# Patient Record
Sex: FEMALE | Race: WHITE | NOT HISPANIC OR LATINO | Employment: PART TIME | URBAN - METROPOLITAN AREA
[De-identification: names, ages, dates, MRNs, and addresses within clinical notes are randomized per-mention and may not be internally consistent; named-entity substitution may affect disease eponyms.]

---

## 2024-01-17 ENCOUNTER — ANCILLARY PROCEDURE (OUTPATIENT)
Dept: RADIOLOGY | Facility: CLINIC | Age: 64
End: 2024-01-17
Payer: MEDICARE

## 2024-01-17 PROCEDURE — 71271 CT THORAX LUNG CANCER SCR C-: CPT

## 2024-03-26 ENCOUNTER — HOSPITAL ENCOUNTER (OUTPATIENT)
Dept: RADIOLOGY | Facility: CLINIC | Age: 64
Discharge: HOME | End: 2024-03-26
Payer: MEDICARE

## 2024-03-26 VITALS — HEIGHT: 64 IN

## 2024-03-26 DIAGNOSIS — Z12.31 SCREENING MAMMOGRAM FOR BREAST CANCER: ICD-10-CM

## 2024-03-26 PROCEDURE — 77067 SCR MAMMO BI INCL CAD: CPT | Performed by: RADIOLOGY

## 2024-03-26 PROCEDURE — 77067 SCR MAMMO BI INCL CAD: CPT

## 2024-03-26 PROCEDURE — 77063 BREAST TOMOSYNTHESIS BI: CPT | Performed by: RADIOLOGY

## 2024-03-27 ENCOUNTER — HOSPITAL ENCOUNTER (OUTPATIENT)
Dept: RADIOLOGY | Facility: EXTERNAL LOCATION | Age: 64
Discharge: HOME | End: 2024-03-27

## 2024-05-07 VITALS — WEIGHT: 160 LBS | BODY MASS INDEX: 28.35 KG/M2 | HEIGHT: 63 IN

## 2025-02-28 ENCOUNTER — HOSPITAL ENCOUNTER (OUTPATIENT)
Dept: RADIOLOGY | Facility: CLINIC | Age: 65
End: 2025-02-28
Payer: COMMERCIAL

## 2025-02-28 ENCOUNTER — APPOINTMENT (OUTPATIENT)
Dept: ORTHOPEDIC SURGERY | Facility: CLINIC | Age: 65
End: 2025-02-28
Payer: COMMERCIAL

## 2025-02-28 VITALS — WEIGHT: 165 LBS | HEIGHT: 63 IN | BODY MASS INDEX: 29.23 KG/M2

## 2025-02-28 DIAGNOSIS — M25.511 RIGHT SHOULDER PAIN, UNSPECIFIED CHRONICITY: Primary | ICD-10-CM

## 2025-02-28 DIAGNOSIS — M75.31 CALCIFIC TENDINITIS OF RIGHT SHOULDER: ICD-10-CM

## 2025-02-28 PROCEDURE — 20610 DRAIN/INJ JOINT/BURSA W/O US: CPT | Performed by: STUDENT IN AN ORGANIZED HEALTH CARE EDUCATION/TRAINING PROGRAM

## 2025-02-28 PROCEDURE — 1036F TOBACCO NON-USER: CPT | Performed by: STUDENT IN AN ORGANIZED HEALTH CARE EDUCATION/TRAINING PROGRAM

## 2025-02-28 PROCEDURE — 3008F BODY MASS INDEX DOCD: CPT | Performed by: STUDENT IN AN ORGANIZED HEALTH CARE EDUCATION/TRAINING PROGRAM

## 2025-02-28 PROCEDURE — 99204 OFFICE O/P NEW MOD 45 MIN: CPT | Performed by: STUDENT IN AN ORGANIZED HEALTH CARE EDUCATION/TRAINING PROGRAM

## 2025-02-28 RX ORDER — TRIAMCINOLONE ACETONIDE 40 MG/ML
80 INJECTION, SUSPENSION INTRA-ARTICULAR; INTRAMUSCULAR
Status: COMPLETED | OUTPATIENT
Start: 2025-02-28 | End: 2025-02-28

## 2025-02-28 RX ORDER — TRAZODONE HYDROCHLORIDE 50 MG/1
1 TABLET ORAL DAILY PRN
COMMUNITY

## 2025-02-28 RX ORDER — METHYLPREDNISOLONE 4 MG
TABLET, DOSE PACK ORAL
COMMUNITY

## 2025-02-28 RX ORDER — CHOLECALCIFEROL (VITAMIN D3) 50 MCG
1 TABLET ORAL DAILY
COMMUNITY

## 2025-02-28 RX ORDER — FLUOXETINE HYDROCHLORIDE 20 MG/1
CAPSULE ORAL
COMMUNITY
Start: 2025-01-30

## 2025-02-28 RX ORDER — PREDNISONE 10 MG/1
10 TABLET ORAL DAILY
COMMUNITY

## 2025-02-28 RX ORDER — LEVOTHYROXINE SODIUM 175 UG/1
1 TABLET ORAL DAILY
COMMUNITY

## 2025-02-28 RX ORDER — DIVALPROEX SODIUM 125 MG/1
250 TABLET, DELAYED RELEASE ORAL 2 TIMES DAILY
COMMUNITY

## 2025-02-28 RX ORDER — CYCLOBENZAPRINE HCL 10 MG
TABLET ORAL
COMMUNITY

## 2025-02-28 RX ORDER — MELOXICAM 15 MG/1
1 TABLET ORAL DAILY
COMMUNITY

## 2025-02-28 RX ORDER — LIDOCAINE HYDROCHLORIDE 10 MG/ML
5 INJECTION, SOLUTION INFILTRATION; PERINEURAL
Status: COMPLETED | OUTPATIENT
Start: 2025-02-28 | End: 2025-02-28

## 2025-02-28 RX ORDER — HYDROXYCHLOROQUINE SULFATE 200 MG/1
TABLET, FILM COATED ORAL
COMMUNITY

## 2025-02-28 RX ORDER — BUPROPION HYDROCHLORIDE 100 MG/1
100 TABLET ORAL
COMMUNITY

## 2025-02-28 RX ADMIN — TRIAMCINOLONE ACETONIDE 80 MG: 40 INJECTION, SUSPENSION INTRA-ARTICULAR; INTRAMUSCULAR at 11:51

## 2025-02-28 RX ADMIN — LIDOCAINE HYDROCHLORIDE 5 ML: 10 INJECTION, SOLUTION INFILTRATION; PERINEURAL at 11:51

## 2025-02-28 ASSESSMENT — PAIN - FUNCTIONAL ASSESSMENT: PAIN_FUNCTIONAL_ASSESSMENT: 0-10

## 2025-02-28 ASSESSMENT — PAIN SCALES - GENERAL: PAINLEVEL_OUTOF10: 3

## 2025-02-28 NOTE — PROGRESS NOTES
CHIEF COMPLAINT:   Chief Complaint   Patient presents with    Right Shoulder - Pain       History: 64 y.o. female presents to the office today for their progressive right shoulder problems for the past 6 months. Pain is at the superior part of the GH joint, mid muscle belly of the biceps brachi, and the middle past of the posterior forearm. Pain is worse with overhead or behind the back activities and shoulder abduction. Hx of rheumatoid arthritis. No Hx of trauma, Sx, or injections to the areas. Reports of one instance of numbness/tingling constant 1 months ago, but no constant or progressive  numbness/tingling since then. Denies associated neck pain. No diabetes. XR were transferred over from PACS. Tylenol, Mobic, Hydroxychloroquine, Prednisone and Flexeril PRN. PT done 3 years ago. No recent PT. 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  She is still very active.  She is right-hand dominant.  States that the pain has been ongoing for actually multiple years but has not had any recent treatment.        Past medical history, past surgical history, medications, allergies, family history, social history, and review of systems were reviewed today.    A 12 point review of systems was negative other than as stated in the HPI.    No past medical history on file.     Not on File     Past Surgical History:   Procedure Laterality Date    BREAST BIOPSY          No family history on file.     Social History     Socioeconomic History    Marital status:      Spouse name: Not on file    Number of children: Not on file    Years of education: Not on file    Highest education level: Not on file   Occupational History    Not on file   Tobacco Use    Smoking status: Not on file    Smokeless tobacco: Not on file   Substance and Sexual Activity    Alcohol use: Not on file    Drug use: Not on file    Sexual activity: Not on file   Other Topics Concern    Not on file   Social History Narrative    Not on file     Social Drivers  "of Health     Financial Resource Strain: Not on file   Food Insecurity: Not on file   Transportation Needs: Not on file   Physical Activity: Not on file   Stress: Not on file   Social Connections: Not on file   Intimate Partner Violence: Not on file   Housing Stability: Not on file        CURRENT MEDICATIONS:   Current Outpatient Medications   Medication Sig Dispense Refill    FLUoxetine (PROzac) 20 mg capsule       buPROPion (Wellbutrin) 100 mg tablet Take 1 tablet (100 mg) by mouth.      cholecalciferol (Vitamin D-3) 50 MCG (2000 UT) tablet Take 1 tablet (2,000 Units) by mouth once daily.      cyclobenzaprine (Flexeril) 10 mg tablet 1 tablet 1 to 2 hours before bedtime Orally Once a day for 30 day(s)      divalproex (Depakote) 125 mg EC tablet Take 2 tablets (250 mg) by mouth twice a day.      glucosamine sulfate 500 mg tablet Take by mouth.      hydroxychloroquine (Plaquenil) 200 mg tablet 1 tablet with food or milk Orally Once a day for 90 days      levothyroxine (Synthroid, Levoxyl) 175 mcg tablet Take 1 tablet (175 mcg) by mouth once daily.      meloxicam (Mobic) 15 mg tablet Take 1 tablet (15 mg) by mouth once daily.      traZODone (Desyrel) 50 mg tablet Take 1 tablet (50 mg) by mouth once daily as needed.       No current facility-administered medications for this visit.       Physical Examination:      4/27/2022     3:25 PM 3/26/2024     2:24 PM 3/26/2024     2:35 PM 5/7/2024     1:33 PM   Vitals   Systolic 119      Diastolic 80      Heart Rate 98      Temp 36.4 °C (97.5 °F)      Resp 20      Height  1.6 m (5' 3\") 1.626 m (5' 4\") 1.6 m (5' 3\")   Weight (lb)    160   BMI    28.34 kg/m2   BSA (m2)    1.8 m2      There is no height or weight on file to calculate BMI.    Well-appearing, appears stated age, pleasant and cooperative, appropriate mood and behavior. Height and weight reviewed. Alert and oriented x3.  Auditory function intact.  No acute distress.  Intact ocular function, DARI, EOMI. Breathing is " unlabored .  There is no evidence of jugular venous distension. Skin appearance is normal without evidence of rash or other lesions. 2+ radial pulses bilaterally, fingers pink and wwp, good capillary refill, no pitting edema. No appreciable lymphadenopathy in bilateral upper extremities. SILT throughout both upper extremities, median/radial/ulnar/musculocutaneous/axillary nerve motor and sensory intact (except for abnormalities noted in focused musculoskeletal exam section below).     Neck exam: Full range of motion of the neck in flexion/extension and rotational movements. No significant areas of tenderness to palpation in the neck.    On exam of bilateral upper extremities, still has relatively preserved range of motion of both shoulders, but it is painful on the right.  Forward flexion 170, external Tatian of 60, internal Tatian to T12.  Rotator cuff strength is preserved but painful on the right.  Pain with Neer Lafleur maneuvers of the right shoulder    Imaging: Radiographs of the right shoulder from January 2025 were reviewed.  Personally interpreted by myself.  There is a calcific deposit at the insertion of the supraspinatus    Assessment: Right calcific tendinitis     Plan: Long discussion with the patient with treatment options diagnosis.  Imaging is consistent with diagnosis of calcific tendinitis of the rotator cuff.  Exam is consistent with this as well.  Discussed the natural history of this.  She has apparently has a propensity to form calcium throughout her body.  Discussed that we will attempt an injection and physical therapy first to see if this gives her relief.  If not, we will see her in a few months and get an MRI.    Injection was performed, please see separate procedure note, patient tolerated well.     Patient ID: Arielle Mccormack is a 64 y.o. female.    L Inj/Asp: R subacromial bursa on 2/28/2025 11:51 AM  Indications: pain  Details: 22 G needle, posterior approach  Medications: 80 mg  triamcinolone acetonide 40 mg/mL; 5 mL lidocaine 10 mg/mL (1 %)  Outcome: tolerated well, no immediate complications  Procedure, treatment alternatives, risks and benefits explained, specific risks discussed. Consent was given by the patient. Immediately prior to procedure a time out was called to verify the correct patient, procedure, equipment, support staff and site/side marked as required. Patient was prepped and draped in the usual sterile fashion.           Dragon software was used to dictate this note, please be aware that minor errors in transcription may be present.    Lemuel Conrad MD    Shoulder/Elbow Surgery  [facility]

## 2025-03-06 ENCOUNTER — EVALUATION (OUTPATIENT)
Dept: PHYSICAL THERAPY | Facility: CLINIC | Age: 65
End: 2025-03-06
Payer: COMMERCIAL

## 2025-03-06 DIAGNOSIS — M25.511 RIGHT SHOULDER PAIN, UNSPECIFIED CHRONICITY: Primary | ICD-10-CM

## 2025-03-06 DIAGNOSIS — M75.31 CALCIFIC TENDINITIS OF RIGHT SHOULDER: ICD-10-CM

## 2025-03-06 PROCEDURE — 97110 THERAPEUTIC EXERCISES: CPT | Mod: GP

## 2025-03-06 PROCEDURE — 97161 PT EVAL LOW COMPLEX 20 MIN: CPT | Mod: GP

## 2025-03-06 ASSESSMENT — PAIN - FUNCTIONAL ASSESSMENT: PAIN_FUNCTIONAL_ASSESSMENT: 0-10

## 2025-03-06 ASSESSMENT — ENCOUNTER SYMPTOMS
OCCASIONAL FEELINGS OF UNSTEADINESS: 0
LOSS OF SENSATION IN FEET: 0
DEPRESSION: 0

## 2025-03-06 ASSESSMENT — PAIN SCALES - GENERAL: PAINLEVEL_OUTOF10: 1

## 2025-03-06 NOTE — PROGRESS NOTES
Physical Therapy    Physical Therapy Evaluation    Patient Name: Arielle Mccormack  MRN: [id number]  Today's Date: 3/6/2025  Name and date of birth 1960 were confirmed at the start of today's session.     Time Entry:  Time Calculation  Start Time: 0950  Stop Time: 1035  Time Calculation (min): 45 min  PT Evaluation Time Entry  PT Evaluation (Low) Time Entry: 25  PT Therapeutic Procedures Time Entry  Therapeutic Exercise Time Entry: 20                   Assessment  PT Assessment Results: Decreased strength, Pain  Rehab Prognosis: Good  Barriers to Participation:  (Large per visit cost due to high deductible not met- patient prefers 1 visit; possibly 1 follow up if needed)  Assessment Comment: Arielle (Jasmin), presents to PT with much resolved right shoulder pain and right shoulder ROM WNL since her shoulder injection a week ago. She still exhibits some weakness of scap stabilizers, and shoulder mm and would benefit from skilled PT to learn self management strategies and HEP to maintain her painfree ROM, and improve her right  UE strength to resume her PLOF.  She would like to attend 1 or 2 visits to accomplish this due to a high out of pocket expense. She was instructed on a comprehensive HEP today; was issued handouts and bands, and given our contact information of questions arise or if she wished to schedule a follow up within the month.    Plan  Treatment/Interventions: Education/ Instruction, Manual therapy, Therapeutic exercises  PT Plan: Skilled PT  PT Frequency: Follow-up visit only (Patient will schedule a followup in 3-4 weeks if needed)  Duration: 4 weeks; pending progress  Onset Date: 09/01/24  Number of Treatments Authorized: pending auth from [facility] Potential: Good  Plan of Care Agreement: Patient    Current Problem  1. Right shoulder pain, unspecified chronicity  Referral to Physical Therapy    Follow Up In Physical Therapy      2. Calcific tendinitis of right shoulder  Referral to Physical  Therapy    Follow Up In Physical Therapy          Subjective   General:  General  Reason for Referral: Right shoulder pain; calcific tendonitis  Referred By: Dr Lemuel Conrad  Past Medical History Relevant to Rehab: Reviewed in Epic and on Rehab intake form  General Comment: 6 month history of progressively worsening shoulder pain, and the pain started really about 6 years ago.  Recent xrays showed calcifications.  Had an injection 1 week ago and has had tremendous relief. Prior to the injection she had very limited mobility of her shoulder and significant pain. Now, she is back to doing a majority of her activities with little difficulty. She is hoping to learn what she can do to maintain her mobility and build strength.  Precautions:  Precautions  STEADI Fall Risk Score (The score of 4 or more indicates an increased risk of falling): 0  Precautions Comment: RA  Pain:  Pain Assessment: 0-10  0-10 (Numeric) Pain Score: 1 (before injection it was severe at time)  Pain Type: Chronic pain  Pain Location: Shoulder  Pain Orientation: Right  Pain Interventions:  (Injection one week ago)  Response to Interventions: Decrease in pain  Home Living:  Home Living Comment: Reviewed and no concerns  Prior Function Per Pt/Caregiver Report:  Level of [place]:  (INdependent)  Vocational:  (RN, but semi retired; taking summer off)  Hand Dominance: Right    Objective     Shoulder    Observation   Slight rounded shoulders  Shoulder palpation/Joint Assessment   Mild tenderness over AC jt    Shoulder AROM  L Shoulder flexion: (180°): 145 deg  R Shoulder Extension: (60°): 147 deg  R shoulder abduction: (180°): 160 deg  L Shoulder abduction: (180°): 160 deg  R Shoulder ER: (90°): to back of head  L shoulder ER: (90°): to back of head  R shoulder IR: (70°): to L4 behind back  L shoulder IR: (70°): To L3 behind behind back    Shoulder Strength  R shoulder flexion: (5/5): 4  L shoulder flexion: (5/5): 5  R shoulder abduction: (5/5): 4-  L  shoulder abduction: (5/5): 4+  R shoulder ER: (5/5): 4-  L shoulder ER: (5/5): 4+  R shoulder IR: (5/5) : 4  L shoulder IR: (5/5): 5  Scapular MMT  R lower trapezius: (5/5): 4+  L lower trapezius: (5/5): 4+  R middle trapezius: (5/5): 4  L middle trapezius: (5/5): 4+    Outcome Measures:  Other Measures  Disability of Arm Shoulder Hand (DASH): 21 (raw score)     OP EDUCATION:  Outpatient Education  Individual(s) Educated: Patient  Education Provided: Anatomy, Home Exercise Program, POC  Risk and Benefits Discussed with Patient/Caregiver/Other: yes  Patient/Caregiver Demonstrated Understanding: yes  Plan of Care Discussed and Agreed Upon: yes  Patient Response to Education: Patient/Caregiver Verbalized Understanding of Information, Patient/Caregiver Performed Return Demonstration of Exercises/Activities, Patient/Caregiver Asked Appropriate Questions  Education Comment: HEP handout issued; bands issued.  Access Code: [email or web address]  URL: [email or web address]  Date: 03/06/2025  Prepared by: Eugenie Jack    Exercises  - Standing Overhead Shoulder External Rotation Stretch with Towel  - 1 x daily - 7 x weekly - 5 reps - 20 sec hold  - Shoulder Flexion Wall Slide with Towel  - 1 x daily - 7 x weekly - 3 sets - 10 reps  - Doorway Pec Stretch at 90 Degrees Abduction  - 1 x daily - 7 x weekly - 3 sets - 10 reps  - Shoulder External Rotation and Scapular Retraction with Resistance  - 1 x daily - 2-3 x weekly - 2-3 sets - 10 reps  - Supine Shoulder Horizontal Abduction with Resistance  - 1 x daily - 2-3 x weekly - 2-3 sets - 10 reps  - Standing Shoulder Row with Anchored Resistance  - 1 x daily - 2-3 x weekly - 2-3 sets - 10 reps  - Shoulder extension with resistance - Neutral  - 1 x daily - 2-3 x weekly - 2-3 sets - 10 reps  - Seated Shoulder Flexion with Dumbbells  - 1 x daily - 2-3 x weekly - 3 sets - 10 reps  - Scaption with Dumbbells  - 1 x daily - 2-3 x weekly - 3 sets - 10 reps    Today's treatment:  "20 min:  Towel stretch  Shoulder flexion slide at wall  Doorway pect stretch  \"No money\" yellow x 10  Supine horizontal abd  yellow x 10  Tband mid rows green x 15  Tband pulldowns green x 15  Shoulder flexion 1# 2 x 10  Shoulder scaption 1# 2 x 10    Goals: set and achieved today as one time visit unless Arielle feels the need for a follow up in a few weeks.  Patient Goal:  To learn exercises to help with decreased shoulder motion, improve shoulder strength, and continue to have decreased shoulder pain.  Physical Therapy Goal:    HEP: To establish a HEP to maintain painfree shoulder AROM; improve strength of shoulder muscles to improve function with daily activities.    [employer] Authorization Information    CPT CODES:  THERE-EX  (41022), THERE-ACT (79401), and MANUAL (21939)  Functional outcome tools: Quick Dash  Raw Score: 21  Is this request to provide: None of the above  Evaluation complextiy: Low  Did the patient have a surgical procedure in the last three months related to the condition for which services are being requested: No  Select all conditions that apply: None of these apply     "

## 2025-05-27 ENCOUNTER — APPOINTMENT (OUTPATIENT)
Dept: RADIOLOGY | Facility: CLINIC | Age: 65
End: 2025-05-27
Payer: COMMERCIAL

## 2025-05-30 ENCOUNTER — APPOINTMENT (OUTPATIENT)
Dept: ORTHOPEDIC SURGERY | Facility: CLINIC | Age: 65
End: 2025-05-30
Payer: COMMERCIAL